# Patient Record
Sex: FEMALE | Race: WHITE | URBAN - METROPOLITAN AREA
[De-identification: names, ages, dates, MRNs, and addresses within clinical notes are randomized per-mention and may not be internally consistent; named-entity substitution may affect disease eponyms.]

---

## 2021-08-28 ENCOUNTER — TELEPHONE ENCOUNTER (OUTPATIENT)
Dept: URBAN - METROPOLITAN AREA CLINIC 13 | Facility: CLINIC | Age: 31
End: 2021-08-28

## 2021-08-29 ENCOUNTER — TELEPHONE ENCOUNTER (OUTPATIENT)
Dept: URBAN - METROPOLITAN AREA CLINIC 13 | Facility: CLINIC | Age: 31
End: 2021-08-29

## 2022-01-10 ENCOUNTER — HOSPITAL ENCOUNTER (EMERGENCY)
Dept: HOSPITAL 5 - ED | Age: 32
LOS: 1 days | Discharge: HOME | End: 2022-01-11
Payer: MEDICARE

## 2022-01-10 DIAGNOSIS — Z79.899: ICD-10-CM

## 2022-01-10 DIAGNOSIS — F31.9: Primary | ICD-10-CM

## 2022-01-10 DIAGNOSIS — Z90.710: ICD-10-CM

## 2022-01-10 PROCEDURE — 80048 BASIC METABOLIC PNL TOTAL CA: CPT

## 2022-01-10 PROCEDURE — 81001 URINALYSIS AUTO W/SCOPE: CPT

## 2022-01-10 PROCEDURE — 85025 COMPLETE CBC W/AUTO DIFF WBC: CPT

## 2022-01-10 PROCEDURE — 36415 COLL VENOUS BLD VENIPUNCTURE: CPT

## 2022-01-10 PROCEDURE — 99283 EMERGENCY DEPT VISIT LOW MDM: CPT

## 2022-01-10 PROCEDURE — G0480 DRUG TEST DEF 1-7 CLASSES: HCPCS

## 2022-01-10 PROCEDURE — 80307 DRUG TEST PRSMV CHEM ANLYZR: CPT

## 2022-01-10 PROCEDURE — 80320 DRUG SCREEN QUANTALCOHOLS: CPT

## 2022-01-11 VITALS — DIASTOLIC BLOOD PRESSURE: 57 MMHG | SYSTOLIC BLOOD PRESSURE: 106 MMHG

## 2022-01-11 LAB
BACTERIA #/AREA URNS HPF: (no result) /HPF
BASOPHILS # (AUTO): 0.1 K/MM3 (ref 0–0.1)
BASOPHILS NFR BLD AUTO: 0.8 % (ref 0–1.8)
BILIRUB UR QL STRIP: (no result)
BLOOD UR QL VISUAL: (no result)
BUN SERPL-MCNC: 13 MG/DL (ref 7–17)
BUN/CREAT SERPL: 19 %
CALCIUM SERPL-MCNC: 9 MG/DL (ref 8.4–10.2)
EOSINOPHIL # BLD AUTO: 0.2 K/MM3 (ref 0–0.4)
EOSINOPHIL NFR BLD AUTO: 2.3 % (ref 0–4.3)
HCT VFR BLD CALC: 35.9 % (ref 30.3–42.9)
HEMOLYSIS INDEX: 14
HGB BLD-MCNC: 12.1 GM/DL (ref 10.1–14.3)
LYMPHOCYTES # BLD AUTO: 4 K/MM3 (ref 1.2–5.4)
LYMPHOCYTES NFR BLD AUTO: 40.8 % (ref 13.4–35)
MCHC RBC AUTO-ENTMCNC: 34 % (ref 30–34)
MCV RBC AUTO: 94 FL (ref 79–97)
MONOCYTES # (AUTO): 0.9 K/MM3 (ref 0–0.8)
MONOCYTES % (AUTO): 9.3 % (ref 0–7.3)
MUCOUS THREADS #/AREA URNS HPF: (no result) /HPF
PH UR STRIP: 6 [PH] (ref 5–7)
PLATELET # BLD: 220 K/MM3 (ref 140–440)
PROT UR STRIP-MCNC: (no result) MG/DL
RBC # BLD AUTO: 3.84 M/MM3 (ref 3.65–5.03)
RBC #/AREA URNS HPF: 2 /HPF (ref 0–6)
UROBILINOGEN UR-MCNC: 4 MG/DL (ref ?–2)
WBC #/AREA URNS HPF: 1 /HPF (ref 0–6)

## 2022-01-11 NOTE — EMERGENCY DEPARTMENT REPORT
<LAUREN ROSENBAUM - Last Filed: 01/11/22 12:48>





ED Psych HPI





- General


Stated Complaint: NEAR SYNCOPE, DOESN'T FEEL SAFE AT [facility].


Time Seen by Provider: 01/11/22 00:49





ED Medical Decision Making





- Lab Data


Result diagrams: 


                                 01/11/22 01:13





                                 01/11/22 01:13





ED Disposition


Clinical Impression: 


 Bipolar disorder





Disposition: 01 HOME / SELF CARE / HOMELESS


Is pt being admited?: No


Does the pt Need Aspirin: No


Condition: Stable


Additional Instructions: 


Professional and Agency Contacts To help Resolve Crises(24/7)


[place] Crisis Line: 1-167.961.9229


Suicide Prevention Line: 1-799.311.6164


Crisis Text Line: Text START to 025531


Emergency: 911





Outpatient COMMUNITY Behavioral Health Resources:





[facility]: [facility]


[address] [place] [place], [place] [address] 


Phone: [phone]





[facility]


[address] [place] [address]


Phone: [phone]





[place]: [facility] -


[address] [place] [id number]


Phone: [phone]


Monday thru Friday - 8am - 5pm





[facility]


Address: [address] [place] [address]


Phone: [phone]





[place]:


[facility]


Address: [address] [place] [address]


Monday thru Friday- 7am-2pm


Phone: [phone]





[facility]


Address: [address] [place] [address]


Monday thru Friday: 8:30AM-5PM


Phone: [phone]





Referrals: 


PRIMARY CARE,MD [Primary Care Provider] - 3-5 Days





<ANDREZ SHANE - Last Filed: 01/12/22 00:10>





ED Psych HPI





- General


Source: patient


Mode of arrival: Ambulatory


Limitations: No Limitations





- History of Present Illness


Initial Comments: 





Chief complaint: "I just do not feel safe."





HPI: This 31-year-old female with history of bipolar disorder who presents from 

[facility] via EMS.  She states that she just "does not 

feel safe".  She denies any pain or discomfort at this time.  She denies s

uicidal homicidal ideation.  She denies hallucinations.


MD Complaint: other ("I just do not feel safe.")


-: days(s) (1 day)


Associated Psychiatric Symptoms: none


Quality: constant


Improves With: none


Worsens With: none


Associated Symptoms: denies other symptoms


Treatments Prior to Arrival: other (EMS transfer from [facility].)





ED Review of Systems


ROS: 


Stated complaint: NEAR SYNCOPE, DOESN'T FEEL SAFE AT [facility].


Other details as noted in HPI





Comment: All other systems reviewed and negative


Constitutional: denies: fever, malaise


Cardiovascular: denies: chest pain, dyspnea on exertion


Gastrointestinal: denies: abdominal pain, nausea, vomiting


Psychiatric: denies: anxiety, depression, auditory hallucinations, visual 

hallucinations, homicidal thoughts, suicidal thoughts





ED Past Medical Hx





- Past Medical History


Previous Medical History?: Yes


Hx Psychiatric Treatment: Yes (Bipolar disorder)





- Surgical History


Past Surgical History?: Yes


Additional Surgical History: Hysterectomy





- Social History


Smoking Status: Never Smoker


Substance Use Type: None





ED Physical Exam





- General


Limitations: No Limitations


General appearance: alert, in no apparent distress, other (Pleasant cooperative 

calm)





- Head


Head exam: Present: atraumatic, normocephalic





- Eye


Eye exam: Present: normal appearance





- ENT


ENT exam: Present: mucous membranes moist





- Neck


Neck exam: Present: normal inspection





- Respiratory


Respiratory exam: Present: normal lung sounds bilaterally.  Absent: respiratory 

distress





- Cardiovascular


Cardiovascular Exam: Present: regular rate, normal rhythm.  Absent: systolic 

murmur, diastolic murmur, rubs, gallop





- GI/Abdominal


GI/Abdominal exam: Present: soft, normal bowel sounds.  Absent: distended, 

tenderness, guarding, rebound





- Extremities Exam


Extremities exam: Present: normal inspection





- Back Exam


Back exam: Present: normal inspection





- Neurological Exam


Neurological exam: Present: alert, oriented X3





- Psychiatric


Psychiatric exam: Present: normal affect, normal mood, other (Jovial 

disposition).  Absent: depressed, agitated, anxious, flat affect, manic, 

homicidal ideation, suicidal ideation





- Skin


Skin exam: Present: warm, dry, intact, normal color.  Absent: rash





ED Course


                                   Vital Signs











  01/11/22 01/11/22





  05:35 13:18


 


Temperature 98.0 F 98.5 F


 


Pulse Rate 64 67


 


Respiratory 18 18





Rate  


 


Blood Pressure 109/69 106/57





[Left]  


 


O2 Sat by Pulse 98 98





Oximetry  














ED Medical Decision Making





- Lab Data


Result diagrams: 


                                 01/11/22 01:13





                                 01/11/22 01:13





- Medical Decision Making





This is a 31-year-old female with history of bipolar disorder who states that 

she does "feel safe over there."  She denies suicidal homicidal ideation.  

Denies hallucinations.  She is medically clear for psychiatric care.  Awaiting 

treatment recommendations per mental health team.  She does not appear to be a 

harm to herself or others.





CBC chemistry salicylate acetaminophen blood alcohol levels unremarkable.  Ms. Roche is medically clear for psychiatric care.  No indication for involuntary 

hold at this time.


Critical care attestation.: 


If time is entered above; I have spent that time in minutes in the direct care 

of this critically ill patient, excluding procedure time.

## 2022-01-11 NOTE — CONSULTATION
History of Present Illness





- Reason for Consult


Consult date: 01/11/22


Reason for consult: mental health evaluation





- History of Present Psychiatric Illness


 The patient was seen today. She reports doing well. The patient reports hearing

voices " hearing voices for ever." The patient denies any current 

suicidal/homicidal ideation and denies visual hallucinations. 





Diagnoses: Bipolar


Suicide attempts or Self-harm behavior: Yes


Prior psychiatric hospitalizations: Yes


Substance Abuse history: Denies


Previous psychiatric medications tried:unable to recall


Outpatient treatment: unknown





PAST MEDICAL HISTORY: unknown





Family Psychiatric History: None reported or documented





SOCIAL HISTORY


Marital Status: Single


Living Arrangements: lives in a group home


Employment Status: unemployed


Access to guns/weapons: Denies 


Education: 12th


History of Abuse: none reported


Legal History: none reported





REVIEW OF SYSTEMS


Constitutional: Negative for weight loss


ENT: Negative for stridor


Respiratory: Negative for cough or hemoptysis


All other systems reviewed and are negative


 


MENTAL STATUS EXAMINATION


General Appearance and Behavior: Age appropriate, good hygiene, wearing 

appropriate clothes, sleeping, cooperative


Cooperation: Participating/engaged


Psychomotor Behavior: normal


Mood: OK


Affect and affective range: congruent with mood


Thought Process: goal directed


Thought Content: reality oriented


Speech: Normal volume, Regular rate and rhythm, 


Suicidal Ideation: Denies


Homicidal Ideation: Denies 


Hallucinations: auditory


Delusions: None elicited


Impulse Control: Unimpaired


Insight and Judgment: Limited insight and judgment,


Memory: Normal, 


Attention: Normal


Orientation: Alert, oriented





 Assessment and Plan 


(1) HX of Bipolar


Current Visit: Yes   Status: Acute





Treatment Plan


Continue home medications





Discontinue 1013


Disposition: Do not recommend psychiatric inpatient admission at this time. 











Medications and Allergies





Mental Status Exam





- Vital signs


                                Last Vital Signs











Temp  98.0 F   01/11/22 05:35


 


Pulse  64   01/11/22 05:35


 


Resp  18   01/11/22 05:35


 


BP  109/69   01/11/22 05:35


 


Pulse Ox  98   01/11/22 05:35














Results


Result Diagrams: 


                                 01/11/22 01:13





                                 01/11/22 01:13


                              Abnormal lab results











  01/11/22 01/11/22 01/11/22 Range/Units





  01:13 01:13 01:13 


 


Lymph % (Auto)  40.8 H    (13.4-35.0)  %


 


Mono % (Auto)  9.3 H    (0.0-7.3)  %


 


Mono # (Auto)  0.9 H    (0.0-0.8)  K/mm3


 


Glucose   123 H   ()  mg/dL


 


Salicylates    < 0.3 L  (2.8-20.0)  mg/dL


 


Acetaminophen     (10.0-30.0)  ug/mL














  01/11/22 Range/Units





  01:13 


 


Lymph % (Auto)   (13.4-35.0)  %


 


Mono % (Auto)   (0.0-7.3)  %


 


Mono # (Auto)   (0.0-0.8)  K/mm3


 


Glucose   ()  mg/dL


 


Salicylates   (2.8-20.0)  mg/dL


 


Acetaminophen  5.0 L  (10.0-30.0)  ug/mL








All other labs normal.